# Patient Record
Sex: MALE | Race: WHITE | NOT HISPANIC OR LATINO | Employment: OTHER | URBAN - METROPOLITAN AREA
[De-identification: names, ages, dates, MRNs, and addresses within clinical notes are randomized per-mention and may not be internally consistent; named-entity substitution may affect disease eponyms.]

---

## 2023-11-21 ENCOUNTER — HOSPITAL ENCOUNTER (OUTPATIENT)
Dept: CARDIOLOGY | Facility: HOSPITAL | Age: 68
Discharge: HOME | End: 2023-11-21
Payer: OTHER GOVERNMENT

## 2023-11-21 ENCOUNTER — LAB (OUTPATIENT)
Dept: LAB | Facility: HOSPITAL | Age: 68
End: 2023-11-21
Payer: OTHER GOVERNMENT

## 2023-11-21 ENCOUNTER — TRANSCRIBE ORDERS (OUTPATIENT)
Dept: ADMINISTRATIVE | Facility: HOSPITAL | Age: 68
End: 2023-11-21
Payer: OTHER GOVERNMENT

## 2023-11-21 DIAGNOSIS — Z01.818 PREOP EXAMINATION: ICD-10-CM

## 2023-11-21 DIAGNOSIS — Z01.818 PREOP EXAMINATION: Primary | ICD-10-CM

## 2023-11-21 LAB
ANION GAP SERPL CALCULATED.3IONS-SCNC: 11 MMOL/L (ref 5–15)
BASOPHILS # BLD AUTO: 0.04 10*3/MM3 (ref 0–0.2)
BASOPHILS NFR BLD AUTO: 0.6 % (ref 0–1.5)
BUN SERPL-MCNC: 24 MG/DL (ref 8–23)
BUN/CREAT SERPL: 20.7 (ref 7–25)
CALCIUM SPEC-SCNC: 9.6 MG/DL (ref 8.6–10.5)
CHLORIDE SERPL-SCNC: 107 MMOL/L (ref 98–107)
CO2 SERPL-SCNC: 27 MMOL/L (ref 22–29)
CREAT SERPL-MCNC: 1.16 MG/DL (ref 0.76–1.27)
DEPRECATED RDW RBC AUTO: 37.3 FL (ref 37–54)
EGFRCR SERPLBLD CKD-EPI 2021: 67.8 ML/MIN/1.73
EOSINOPHIL # BLD AUTO: 0.15 10*3/MM3 (ref 0–0.4)
EOSINOPHIL NFR BLD AUTO: 2.1 % (ref 0.3–6.2)
ERYTHROCYTE [DISTWIDTH] IN BLOOD BY AUTOMATED COUNT: 12 % (ref 12.3–15.4)
GLUCOSE SERPL-MCNC: 96 MG/DL (ref 65–99)
HCT VFR BLD AUTO: 41.6 % (ref 37.5–51)
HGB BLD-MCNC: 14.1 G/DL (ref 13–17.7)
IMM GRANULOCYTES # BLD AUTO: 0.01 10*3/MM3 (ref 0–0.05)
IMM GRANULOCYTES NFR BLD AUTO: 0.1 % (ref 0–0.5)
LYMPHOCYTES # BLD AUTO: 1.56 10*3/MM3 (ref 0.7–3.1)
LYMPHOCYTES NFR BLD AUTO: 22.3 % (ref 19.6–45.3)
MCH RBC QN AUTO: 29.1 PG (ref 26.6–33)
MCHC RBC AUTO-ENTMCNC: 33.9 G/DL (ref 31.5–35.7)
MCV RBC AUTO: 85.8 FL (ref 79–97)
MONOCYTES # BLD AUTO: 0.8 10*3/MM3 (ref 0.1–0.9)
MONOCYTES NFR BLD AUTO: 11.4 % (ref 5–12)
NEUTROPHILS NFR BLD AUTO: 4.44 10*3/MM3 (ref 1.7–7)
NEUTROPHILS NFR BLD AUTO: 63.5 % (ref 42.7–76)
NRBC BLD AUTO-RTO: 0 /100 WBC (ref 0–0.2)
PLATELET # BLD AUTO: 246 10*3/MM3 (ref 140–450)
PMV BLD AUTO: 10.4 FL (ref 6–12)
POTASSIUM SERPL-SCNC: 4 MMOL/L (ref 3.5–5.2)
QT INTERVAL: 432 MS
QTC INTERVAL: 498 MS
RBC # BLD AUTO: 4.85 10*6/MM3 (ref 4.14–5.8)
SODIUM SERPL-SCNC: 145 MMOL/L (ref 136–145)
WBC NRBC COR # BLD AUTO: 7 10*3/MM3 (ref 3.4–10.8)

## 2023-11-21 PROCEDURE — 36415 COLL VENOUS BLD VENIPUNCTURE: CPT

## 2023-11-21 PROCEDURE — 80048 BASIC METABOLIC PNL TOTAL CA: CPT

## 2023-11-21 PROCEDURE — 93005 ELECTROCARDIOGRAM TRACING: CPT | Performed by: ORTHOPAEDIC SURGERY

## 2023-11-21 PROCEDURE — 85025 COMPLETE CBC W/AUTO DIFF WBC: CPT

## 2024-01-10 ENCOUNTER — TRANSCRIBE ORDERS (OUTPATIENT)
Dept: PHYSICAL THERAPY | Facility: CLINIC | Age: 69
End: 2024-01-10
Payer: OTHER GOVERNMENT

## 2024-01-10 DIAGNOSIS — S32.010A COMPRESSION FRACTURE OF L1 VERTEBRA, INITIAL ENCOUNTER: Primary | ICD-10-CM

## 2024-01-12 ENCOUNTER — TREATMENT (OUTPATIENT)
Dept: PHYSICAL THERAPY | Facility: CLINIC | Age: 69
End: 2024-01-12
Payer: OTHER GOVERNMENT

## 2024-01-12 DIAGNOSIS — M54.50 ACUTE BILATERAL LOW BACK PAIN WITHOUT SCIATICA: ICD-10-CM

## 2024-01-12 DIAGNOSIS — R29.898 WEAKNESS OF LEFT HIP: ICD-10-CM

## 2024-01-12 DIAGNOSIS — S32.010A COMPRESSION FRACTURE OF L1 VERTEBRA, INITIAL ENCOUNTER: Primary | ICD-10-CM

## 2024-01-12 NOTE — PROGRESS NOTES
Physical Therapy Initial Evaluation and Plan of Care  [facility] [place] [place]  [address], [address]  [place] [address]    Patient: Leonardo Monet   : 1955  Diagnosis/ICD-10 Code:  Compression fracture of L1 vertebra, initial encounter [S32.010A]  Referring practitioner: SHEN Alcazar  Date of Initial Visit: 2024  Today's Date: 2024  Patient seen for 1 sessions      ICD-10-CM ICD-9-CM   1. Compression fracture of L1 vertebra, initial encounter  S32.010A 805.4   2. Acute bilateral low back pain without sciatica  M54.50 724.2     338.19   3. Weakness of left hip  R29.898 729.89              Subjective Questionnaire: Oswestry: 24%      Subjective Evaluation    History of Present Illness  Date of onset: 2023  Date of surgery: 2023  Mechanism of injury: Leonardo Monet is a 68 year old male who reports to clinic today with current complaints of pain in his lower back that is exacerbated by lifting and prolonged walking.  He reports falling off a raised deck and onto the cab of his truck on 2023 and injuring his back.  He was found to have a compression fracture of L1 vertebra and had subsequent hypoplasty on 2023 and his pain decreased, but he has had lingering symptoms as noted above.      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      Static Posture     Thoracic Spine  Hypokyphosis.    Lumbar Spine   Decreased lordosis.     Postural Observations  Seated posture: fair  Standing posture: fair  Correction of posture: has no consistent effect      Palpation   Left   Hypertonic in the lumbar paraspinals.   Tenderness of the lumbar paraspinals.     Right   Hypertonic in the lumbar paraspinals. Tenderness of the lumbar paraspinals.     Tenderness     Additional Tenderness Details  Along transverse process B L1 region    Neurological Testing     Sensation     Lumbar   Left   Intact: light touch    Right   Intact: light touch    Comments   Left light touch: numbness and tingling in anterior thigh  Right light touch: numbness and tingling in anterior thigh    Active Range of Motion     Lumbar   Flexion: WFL  Extension: Active lumbar extension: moderately limited. with pain  Left lateral flexion: with pain  Right lateral flexion: WFL  Left rotation: with pain  Right rotation: WFL    Strength/Myotome Testing     Lumbar   Left   Heel walk: normal  Toe walk: normal    Right   Heel walk: normal  Toe walk: normal    Left Hip   Planes of Motion   Flexion: 4 (pain in back)  Extension: 5  Abduction: 5  Adduction: 5    Right Hip   Planes of Motion   Flexion: 5  Extension: 5  Abduction: 5  Adduction: 5    Left Knee   Flexion: 5  Extension: 5    Right Knee   Flexion: 5  Extension: 5    Left Ankle/Foot   Dorsiflexion: 5  Plantar flexion: 5  Great toe extension: 5    Right Ankle/Foot   Dorsiflexion: 5  Plantar flexion: 5  Great toe extension: 5    Tests       Thoracic   Negative slump.     Lumbar     Left   Negative passive SLR.     Right   Negative passive SLR.       See Exercise, Manual, and Modality Logs for complete treatment    Trial of interferential current electrical stimulation in attempt to reduce swelling, relieve pain, stop  muscle spasms, increase blood flow and support healing    Issued, instructed in & performed home exercise program below:   Access Code: [email or web address]  URL: [email or web address]  Date: 01/12/2024  Prepared by: Anmol Clemente    Exercises  - Supine Lower Trunk Rotation  - 1 x daily - 7 x weekly - 1 sets - 10 reps  - Supine Posterior Pelvic Tilt  - 1 x daily - 7 x weekly - 1 sets - 10 reps - 5 sec hold  - Supine Active Straight Leg Raise  - 1 x daily - 7 x weekly - 1 sets - 10 reps  - Standing Lumbar Extension at Wall - Forearms  - 1 x daily - 7 x weekly - 1 sets - 10 reps      Assessment & Plan       Assessment  Impairments: abnormal or restricted ROM, activity intolerance, impaired physical strength, lacks appropriate home exercise program and pain with function   Functional limitations: carrying objects, lifting, sleeping, walking, pulling, pushing, uncomfortable because of pain, standing and unable to perform repetitive tasks   Assessment details: 68 year old male who presents with the impairments noted above secondary to low back pain, hip weakness and decreased spinal range of motion and the impairments and functional limitations noted above.  These impairments decrease his ability and tolerance to perform his normal daily activities.  This patient presents with a level of complexity and his condition is such that the services required can be safely and effectively performed only by a qualified PT or supervised PTA.     Prognosis: good    Goals  Plan Goals: STG (6 weeks)  1) Independent in home program for lower extremity and core strengthening  2) Independent in home program for posture correction  3) Demonstrates basic understanding of condition and role in treatment progression  4) Tolerates initiation of resistive lower extremity and core strengthening   5) Demonstrates moderate improvement in tolerance to daily activity as evidenced by Oswestry disability score of 2% or less    LTG (12 Weeks)  1)  Independent in home program for self-management of  condition  2) Demonstrates substantial improvement in tolerance to daily activity as evidenced by Oswestry disability score of 10% or less  3) MMT of (B) lower extremities in major planes 5/5 to allow for improved tolerance to ambulation.  4) Demonstrates MMT for trunk flexion and extension of 5/5 to allow for improved spinal stability and improved tolerance to activities such as transfers and bending.  5) Demonstrates normal lumbar spinal mechanics to allow for pain-free motion of the lumbar spine.  6) Demonstrates good posture in standing and good posture in sitting to improve tolerance to those positions.  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 without increasing pain and without mobility issues      Plan  Therapy options: will be seen for skilled therapy services  Planned modality interventions: cryotherapy, high voltage pulsed current (pain management), high voltage pulsed current (spasm management), ultrasound, thermotherapy (hydrocollator packs), TENS and microcurrent electrical stimulation  Planned therapy interventions: abdominal trunk stabilization, balance/weight-bearing training, body mechanics training, flexibility, functional ROM exercises, home exercise program, IADL retraining, manual therapy, neuromuscular re-education, postural training, soft tissue mobilization, strengthening, stretching and therapeutic activities  Frequency: 2x week  Duration in weeks: 12  Treatment plan discussed with: patient        History # of Personal Factors and/or Comorbidities: LOW (0)  Examination of Body System(s): # of elements: LOW (1-2)  Clinical Presentation: STABLE   Clinical Decision Making: LOW     Timed:         Manual Therapy:         mins  99672;     Therapeutic Exercise:    20     mins  13504;     Neuromuscular Elsi:        mins  12337;    Therapeutic Activity:          mins  91207;     Gait Training:           mins  61146;      Ultrasound:          mins  99094;    Ionto                                   mins   45601  Self Care                            mins   77900      Un-Timed:  Electrical Stimulation:    15     mins  02223 ( );  Canalith Repos         mins 09064  Traction          mins 38357  Dry Needle                 ______ mins DRYNDL  Low Eval     30     Mins  81718  Mod Eval          Mins  21358  High Eval                            Mins  53360  Re-Eval                               mins  99774        Timed Treatment:   20   mins   Total Treatment:     65   mins    PT SIGNATURE: Sea Clemente, PT   DATE TREATMENT INITIATED: 1/12/2024    Initial Certification  Certification Period: 1/12/2024 through 4/11/2024  I certify that the therapy services are furnished while this patient is under my care.  The services outlined above are required by this patient, and will be reviewed every 90 days.     PHYSICIAN: Nir Piña PA      DATE:     Please sign and return via fax to [phone]. Thank you, [facility].

## 2024-01-16 ENCOUNTER — TREATMENT (OUTPATIENT)
Dept: PHYSICAL THERAPY | Facility: CLINIC | Age: 69
End: 2024-01-16
Payer: OTHER GOVERNMENT

## 2024-01-16 DIAGNOSIS — M54.50 ACUTE BILATERAL LOW BACK PAIN WITHOUT SCIATICA: ICD-10-CM

## 2024-01-16 DIAGNOSIS — S32.010A COMPRESSION FRACTURE OF L1 VERTEBRA, INITIAL ENCOUNTER: Primary | ICD-10-CM

## 2024-01-16 DIAGNOSIS — R29.898 WEAKNESS OF LEFT HIP: ICD-10-CM

## 2024-01-16 NOTE — PROGRESS NOTES
Physical Therapy Treatment Note  [facility] [place]  [address], [address]  [place] [address]    Patient: Leonardo Monet   : 1955  Diagnosis/ICD-10 Code:  Compression fracture of L1 vertebra, initial encounter [S32.010A]  Referring practitioner: SHEN Alcazar  Date of Initial Visit: No linked episodes  Today's Date: 2024  Patient seen for 2 sessions           Subjective     Leonardo Monet reports: Back pain levels still about the same.   Noticing that it is hurting more on his left side than his right.  No issues with home program.  Noes significant relief of symptoms with interferential current electrical stimulation last visit.    Objective   See Exercise, Manual, and Modality Logs for complete treatment.     Progressed / advanced exercises as noted in flow sheets with emphasis on core stabilization and strengthening.    Continued interferential current electrical stimulation to low back at end of treatment.      Assessment/Plan  Still sore in low back but is able to tolerate home program and interventions in clinic well.  Notes good response to interferential current electrical stimulation.    Progress per Plan of Care and Progress strengthening /stabilization /functional activity as tolerated.  Assess response to today's interventions.           Manual Therapy:         mins  22562;  Therapeutic Exercise:    18     mins  10097;     Neuromuscular Elsi:    10    mins  00564;    Therapeutic Activity:     10     mins  61256;     Gait Training:           mins  99689;     Ultrasound:          mins  23524;    Electrical Stimulation:    15     mins  33758 ( );  Dry Needling          mins self-pay    Timed Treatment:   38   mins   Total Treatment:     53   mins    Sea Clemente PT  Physical Therapist

## 2024-01-19 ENCOUNTER — TREATMENT (OUTPATIENT)
Dept: PHYSICAL THERAPY | Facility: CLINIC | Age: 69
End: 2024-01-19
Payer: OTHER GOVERNMENT

## 2024-01-19 DIAGNOSIS — R29.898 WEAKNESS OF LEFT HIP: ICD-10-CM

## 2024-01-19 DIAGNOSIS — M54.50 ACUTE BILATERAL LOW BACK PAIN WITHOUT SCIATICA: ICD-10-CM

## 2024-01-19 DIAGNOSIS — S32.010A COMPRESSION FRACTURE OF L1 VERTEBRA, INITIAL ENCOUNTER: Primary | ICD-10-CM

## 2024-01-21 NOTE — PROGRESS NOTES
Physical Therapy Treatment Note  [facility] [place] [facility] [place]  [address], [address]  [place] [address]    Patient: Leonardo Monet   : 1955  Diagnosis/ICD-10 Code:  Compression fracture of L1 vertebra, initial encounter [S32.010A]  Referring practitioner: SHEN Alcazar  Date of Initial Visit: Type: THERAPY  Noted: 2024  Today's Date: 2024    Patient seen for 3 sessions           Subjective     Leonardo Monet reports: continues to note significant relief of symptoms with interferential current electrical stimulation.  Overall some slight improvement.    Objective   See Exercise, Manual, and Modality Logs for complete treatment.     Progressed / advanced exercises as noted in flow sheets with emphasis on core stabilization and strengthening.    Continued interferential current electrical stimulation to low back at end of treatment.     Initiated hip strengthening and stabilization.    Assessment/Plan  Still sore in low back but is able to tolerate home program and interventions in clinic well.  Notes good response to interferential current electrical stimulation.    Progress per Plan of Care and Progress strengthening /stabilization /functional activity as tolerated.  Assess response to today's interventions.           Manual Therapy:         mins  01347;  Therapeutic Exercise:   20     mins  34270;     Neuromuscular Elsi:    10    mins  48475;    Therapeutic Activity:     10     mins  59232;     Gait Training:           mins  70051;     Ultrasound:          mins  47991;    Electrical Stimulation:    15     mins  57868 ( );  Dry Needling          mins self-pay    Timed Treatment:   40   mins   Total Treatment:     55   mins    Sea Clemente PT  Physical Therapist

## 2024-01-23 ENCOUNTER — TREATMENT (OUTPATIENT)
Dept: PHYSICAL THERAPY | Facility: CLINIC | Age: 69
End: 2024-01-23
Payer: OTHER GOVERNMENT

## 2024-01-23 DIAGNOSIS — M54.50 ACUTE BILATERAL LOW BACK PAIN WITHOUT SCIATICA: ICD-10-CM

## 2024-01-23 DIAGNOSIS — R29.898 WEAKNESS OF LEFT HIP: ICD-10-CM

## 2024-01-23 DIAGNOSIS — S32.010A COMPRESSION FRACTURE OF L1 VERTEBRA, INITIAL ENCOUNTER: Primary | ICD-10-CM

## 2024-01-23 NOTE — PROGRESS NOTES
Physical Therapy Treatment Note  [facility] [place]  [address], [address]  [place] [address]    Patient: Leonardo Monet   : 1955  Diagnosis/ICD-10 Code:  Compression fracture of L1 vertebra, initial encounter [S32.010A]  Referring practitioner: SHEN Alcazar  Date of Initial Visit: Type: THERAPY  Noted: 2024  Today's Date: 2024    Patient seen for 4 sessions           Subjective     Leonardo Monet reports: Back pain still about the same,  He notes some right sided pain in his mid-back when walking and continues to have pain on left side at level of surgery site.  He has ordered home TENS/muscle stimulation unit for home.      Objective   See Exercise, Manual, and Modality Logs for complete treatment.     Progressed / advanced exercises as noted in flow sheets with emphasis on core stabilization and strengthening.    Continued interferential current electrical stimulation to low back at end of treatment.     Added lumbar stretch in sitting.  Hip add and abd changed to sitting.    Interferential current electrical stimulation today in supine vs prone.    Assessment/Plan  Continues to have soreness in low back and now notes some pain with walking in mid-back.  He is improving with his performance of exercises and had good response today with seated lumbar stretch and changing position for interferential current electrical stimulation from prone to supine.    Progress per Plan of Care and Progress strengthening /stabilization /functional activity as tolerated.  Assess response to today's interventions.           Manual Therapy:         mins  24981;  Therapeutic Exercise:   20     mins  98964;     Neuromuscular Elsi:    10    mins  60772;    Therapeutic Activity:     10     mins  91201;     Gait Training:           mins  07954;     Ultrasound:          mins  40728;    Electrical Stimulation:    15     mins  89849 ( );  Dry Needling          mins self-pay    Timed Treatment:   40   mins    Total Treatment:     55   mins    Sea Clemente, PT  Physical Therapist

## 2024-01-25 ENCOUNTER — TREATMENT (OUTPATIENT)
Dept: PHYSICAL THERAPY | Facility: CLINIC | Age: 69
End: 2024-01-25
Payer: OTHER GOVERNMENT

## 2024-01-25 DIAGNOSIS — M54.50 ACUTE BILATERAL LOW BACK PAIN WITHOUT SCIATICA: ICD-10-CM

## 2024-01-25 DIAGNOSIS — R29.898 WEAKNESS OF LEFT HIP: ICD-10-CM

## 2024-01-25 DIAGNOSIS — S32.010A COMPRESSION FRACTURE OF L1 VERTEBRA, INITIAL ENCOUNTER: Primary | ICD-10-CM

## 2024-01-25 NOTE — PROGRESS NOTES
Physical Therapy Treatment Note  [facility] [place]  [address], [address]  [place] [address]    Patient: Leonardo Monet   : 1955  Diagnosis/ICD-10 Code:  Compression fracture of L1 vertebra, initial encounter [S32.010A]  Referring practitioner: SHEN Alcazar  Date of Initial Visit: Type: THERAPY  Noted: 2024  Today's Date: 2024    Patient seen for 5 sessions           Subjective     Leonardo Monet reports: He had some increased soreness after last visit.  He is not sure if the may have increased the intensity of the interferential current electrical stimulation too much.  His home TENS unit has not arrived yet and is due to arrive on Monday.  He does note feeling a little better this morning.    Objective   See Exercise, Manual, and Modality Logs for complete treatment.     Progressed / advanced exercises as noted in flow sheets with emphasis on core stabilization and strengthening.    Continued interferential current electrical stimulation to low back at end of treatment.     Continued lumbar stretch in sitting.  Hip add and abd changed to sitting.     Interferential current electrical stimulation today in supine vs prone.    Assessment/Plan  Some subjective improvement noted by patient.  Doing well with performance of exercises in clinic.  Tolerated progression of exercises well today.    Progress per Plan of Care and Progress strengthening /stabilization /functional activity as tolerated.  Assess response to today's interventions.           Manual Therapy:         mins  82876;  Therapeutic Exercise:   20     mins  92886;     Neuromuscular Elsi:    10    mins  71846;    Therapeutic Activity:     10     mins  65670;     Gait Training:           mins  25608;     Ultrasound:          mins  32079;    Electrical Stimulation:    15     mins  52416 ( );  Dry Needling          mins self-pay    Timed Treatment:   40   mins   Total Treatment:     55   mins    Sea Clemente, PT  Physical  Therapist

## 2024-01-30 ENCOUNTER — TREATMENT (OUTPATIENT)
Dept: PHYSICAL THERAPY | Facility: CLINIC | Age: 69
End: 2024-01-30
Payer: OTHER GOVERNMENT

## 2024-01-30 DIAGNOSIS — R29.898 WEAKNESS OF LEFT HIP: ICD-10-CM

## 2024-01-30 DIAGNOSIS — M54.50 ACUTE BILATERAL LOW BACK PAIN WITHOUT SCIATICA: ICD-10-CM

## 2024-01-30 DIAGNOSIS — S32.010A COMPRESSION FRACTURE OF L1 VERTEBRA, INITIAL ENCOUNTER: Primary | ICD-10-CM

## 2024-01-30 NOTE — PROGRESS NOTES
Physical Therapy Treatment Note  [facility] [place]  [address], [address]  [place] [address]    Patient: Leonardo Monet   : 1955  Diagnosis/ICD-10 Code:  Compression fracture of L1 vertebra, initial encounter [S32.010A]  Referring practitioner: SHEN Alcazar  Date of Initial Visit: Type: THERAPY  Noted: 2024  Today's Date: 2024    Patient seen for 6 sessions           Subjective     Leonardo Monet reports: He continues to notes intermittent pain, but notable relief with interferential current electrical stimulation and MNES today. Denies any radiating leg pain.  He brought in home unit and would like to go over use of unit.    Objective   See Exercise, Manual, and Modality Logs for complete treatment.     Progressed / advanced exercises as noted in flow sheets with emphasis on core stabilization and strengthening.    Patient education:  Went over use of unit in TENS mode and in NEMS.   Instructions on use, care and precautions.  Trial of MNES to low back today.      Continued lumbar stretch in sitting.  Hip add and abd changed to sitting.     Rigid with extension and with left rotation atL5/S1    Assessment/Plan  Some subjective improvement noted by patient.  Good response to use of NMES.  Doing well with performance of exercises in clinic.  Tolerated progression of exercises well today.    Progress per Plan of Care and Progress strengthening /stabilization /functional activity as tolerated.  Assess response to NMES intervention, go over home unit if he brings it in today.           Manual Therapy:         mins  45610;  Therapeutic Exercise:   20     mins  36196;     Neuromuscular Elsi:    10    mins  50593;    Therapeutic Activity:     10     mins  35601;     Gait Training:           mins  62429;     Ultrasound:          mins  77772;    Electrical Stimulation:    15     mins  45391 ( );  Dry Needling          mins self-pay    Timed Treatment:   40   mins   Total Treatment:     55    mins    Sea Clemente, PT  Physical Therapist

## 2024-02-02 ENCOUNTER — TREATMENT (OUTPATIENT)
Dept: PHYSICAL THERAPY | Facility: CLINIC | Age: 69
End: 2024-02-02
Payer: OTHER GOVERNMENT

## 2024-02-02 DIAGNOSIS — M54.50 ACUTE BILATERAL LOW BACK PAIN WITHOUT SCIATICA: ICD-10-CM

## 2024-02-02 DIAGNOSIS — R29.898 WEAKNESS OF LEFT HIP: ICD-10-CM

## 2024-02-02 DIAGNOSIS — S32.010A COMPRESSION FRACTURE OF L1 VERTEBRA, INITIAL ENCOUNTER: Primary | ICD-10-CM

## 2024-02-02 NOTE — PROGRESS NOTES
Physical Therapy Treatment Note  [facility] [place]  [address], [address]  [place] [address]    Patient: Leonardo Monet   : 1955  Diagnosis/ICD-10 Code:  Compression fracture of L1 vertebra, initial encounter [S32.010A]  Referring practitioner: SHEN Alcazar  Date of Initial Visit: Type: THERAPY  Noted: 2024  Today's Date: 2024    Patient seen for 7 sessions           Subjective     Leonardo Monet reports: Doing very well with use of home NEMS unit.  Was pain free for an extended period of time.  Does note some skin irritation on back.  He is not sure if it is reaction to hot tub use or from electrodes.    Objective    See Exercise, Manual, and Modality Logs for complete treatment.     Progressed / advanced exercises as noted in flow sheets with emphasis on core stabilization and strengthening    Resumed interferential current electrical stimulation in clinic.to low back at end of treatment.    Patient education:  make sure to alternate electrode placement.  Have spouse check skin daily.       Continued lumbar stretch in sitting.  Hip add and abd changed to sitting.     Interferential current electrical stimulation today in supine vs prone.    Rigid with extension and with left rotation atL5/S1    Assessment/Plan  Doing well wit pain control with home NEMS/TENS.   Tolerated progression of exercises well today.  Need to work on restoring spinal mobility, especially lumbar extension.    Progress per Plan of Care and Progress strengthening /stabilization /functional activity as tolerated.  Assess response to today's interventions.           Manual Therapy:         mins  34182;  Therapeutic Exercise:   20     mins  16609;     Neuromuscular Elsi:    10    mins  00849;    Therapeutic Activity:     10     mins  36698;     Gait Training:           mins  79542;     Ultrasound:          mins  30313;    Electrical Stimulation:    15     mins  19101 ( );  Dry Needling          mins  self-pay    Timed Treatment:   40   mins   Total Treatment:     55   mins    Sea Clemente, PT  Physical Therapist

## 2024-02-06 ENCOUNTER — TREATMENT (OUTPATIENT)
Dept: PHYSICAL THERAPY | Facility: CLINIC | Age: 69
End: 2024-02-06
Payer: OTHER GOVERNMENT

## 2024-02-06 DIAGNOSIS — R29.898 WEAKNESS OF LEFT HIP: ICD-10-CM

## 2024-02-06 DIAGNOSIS — M54.50 ACUTE BILATERAL LOW BACK PAIN WITHOUT SCIATICA: ICD-10-CM

## 2024-02-06 DIAGNOSIS — S32.010A COMPRESSION FRACTURE OF L1 VERTEBRA, INITIAL ENCOUNTER: Primary | ICD-10-CM

## 2024-02-06 PROCEDURE — 97110 THERAPEUTIC EXERCISES: CPT | Performed by: PHYSICAL THERAPIST

## 2024-02-06 PROCEDURE — G0283 ELEC STIM OTHER THAN WOUND: HCPCS | Performed by: PHYSICAL THERAPIST

## 2024-02-06 PROCEDURE — 97530 THERAPEUTIC ACTIVITIES: CPT | Performed by: PHYSICAL THERAPIST

## 2024-02-06 PROCEDURE — 97112 NEUROMUSCULAR REEDUCATION: CPT | Performed by: PHYSICAL THERAPIST

## 2024-02-09 ENCOUNTER — TREATMENT (OUTPATIENT)
Dept: PHYSICAL THERAPY | Facility: CLINIC | Age: 69
End: 2024-02-09
Payer: OTHER GOVERNMENT

## 2024-02-09 DIAGNOSIS — R29.898 WEAKNESS OF LEFT HIP: ICD-10-CM

## 2024-02-09 DIAGNOSIS — S32.010A COMPRESSION FRACTURE OF L1 VERTEBRA, INITIAL ENCOUNTER: Primary | ICD-10-CM

## 2024-02-09 DIAGNOSIS — M54.50 ACUTE BILATERAL LOW BACK PAIN WITHOUT SCIATICA: ICD-10-CM

## 2024-02-09 PROCEDURE — 97530 THERAPEUTIC ACTIVITIES: CPT | Performed by: PHYSICAL THERAPIST

## 2024-02-09 PROCEDURE — 97112 NEUROMUSCULAR REEDUCATION: CPT | Performed by: PHYSICAL THERAPIST

## 2024-02-09 PROCEDURE — G0283 ELEC STIM OTHER THAN WOUND: HCPCS | Performed by: PHYSICAL THERAPIST

## 2024-02-09 PROCEDURE — 97110 THERAPEUTIC EXERCISES: CPT | Performed by: PHYSICAL THERAPIST

## 2024-02-13 ENCOUNTER — TREATMENT (OUTPATIENT)
Dept: PHYSICAL THERAPY | Facility: CLINIC | Age: 69
End: 2024-02-13
Payer: OTHER GOVERNMENT

## 2024-02-13 DIAGNOSIS — R29.898 WEAKNESS OF LEFT HIP: ICD-10-CM

## 2024-02-13 DIAGNOSIS — M54.50 ACUTE BILATERAL LOW BACK PAIN WITHOUT SCIATICA: ICD-10-CM

## 2024-02-13 DIAGNOSIS — S32.010A COMPRESSION FRACTURE OF L1 VERTEBRA, INITIAL ENCOUNTER: Primary | ICD-10-CM

## 2024-02-20 ENCOUNTER — TREATMENT (OUTPATIENT)
Dept: PHYSICAL THERAPY | Facility: CLINIC | Age: 69
End: 2024-02-20
Payer: OTHER GOVERNMENT

## 2024-02-20 DIAGNOSIS — S32.010A COMPRESSION FRACTURE OF L1 VERTEBRA, INITIAL ENCOUNTER: Primary | ICD-10-CM

## 2024-02-20 DIAGNOSIS — M54.50 ACUTE BILATERAL LOW BACK PAIN WITHOUT SCIATICA: ICD-10-CM

## 2024-02-20 DIAGNOSIS — R29.898 WEAKNESS OF LEFT HIP: ICD-10-CM

## 2024-02-20 NOTE — PROGRESS NOTES
Physical Therapy Treatment Note  [facility] [place]  [address], [address]  [place] [address]    Patient: Leonardo Monet   : 1955  Diagnosis/ICD-10 Code:  Compression fracture of L1 vertebra, initial encounter [S32.010A]  Referring practitioner: SHEN Alcazar  Date of Initial Visit: Type: THERAPY  Noted: 2024  Today's Date: 2024    Patient seen for 11 sessions           Subjective     Leonardo Monet reports: He had some increased soreness after using home muscle stim unit for long time at home.  His pain went away after lying down and resting.  Asked if it was possible to use muscle stim unit too much.    Objective    See Exercise, Manual, and Modality Logs for complete treatment.     Progressed / advanced exercises as noted in flow sheets with emphasis on core stabilization and strengthening    Continued  interferential current electrical stimulation in clinic.to low back at end of treatment.    Patient education:  Have spouse check skin daily due to spots on back (above e-stim pad placement) that look slightly reddened and scaly.    Interferential current electrical stimulation today in supine vs prone.    Rigid with extension and with left rotation atL5/S1    Added mid and upper back exercises today.    Assessment/Plan  Doing well wit pain control with home NEMS/TENS.   Tolerated progression of exercises well today.  Need to work on restoring spinal mobility, especially lumbar extension.    Progress per Plan of Care and Progress strengthening /stabilization /functional activity as tolerated.  Assess response to today's interventions.           Manual Therapy:         mins  42401;  Therapeutic Exercise:   20     mins  90790;     Neuromuscular Elsi:    10    mins  41848;    Therapeutic Activity:     10     mins  83840;     Gait Training:           mins  16740;     Ultrasound:          mins  57231;    Electrical Stimulation:    15     mins  31600 ( );  Dry Needling          mins  self-pay    Timed Treatment:   40   mins   Total Treatment:     55   mins    Sea Clemente, PT  Physical Therapist

## 2024-02-23 ENCOUNTER — TREATMENT (OUTPATIENT)
Dept: PHYSICAL THERAPY | Facility: CLINIC | Age: 69
End: 2024-02-23
Payer: OTHER GOVERNMENT

## 2024-02-23 DIAGNOSIS — M54.50 ACUTE BILATERAL LOW BACK PAIN WITHOUT SCIATICA: ICD-10-CM

## 2024-02-23 DIAGNOSIS — S32.010A COMPRESSION FRACTURE OF L1 VERTEBRA, INITIAL ENCOUNTER: Primary | ICD-10-CM

## 2024-02-23 DIAGNOSIS — R29.898 WEAKNESS OF LEFT HIP: ICD-10-CM

## 2024-02-23 NOTE — PROGRESS NOTES
Physical Therapy Daily Treatment Note    Patient: Leonardo Monet  : 1955  Referring practitioner: SHEN Alcazar  Today's Date: 2024    VISIT#: 12      Subjective   Leonardo Monet reports: Says he is doing ok, is actually a little sore today, thinks he over did it yesterday, was on the floor fixing a fan and had a lot of pain when he went to get up.       Objective     See Exercise, Manual, and Modality Logs for complete treatment.     Patient Education: continue HEP.     Assessment/Plan  Good response to session, only reported some mild pain with pallof press when facing to the right, otherwise tolerated all exercises well and is demonstrating improved functional mobility.    Progress per Plan of Care            Timed:         Manual Therapy:         mins  90447;     Therapeutic Exercise:    20     mins  26957;     Neuromuscular Elsi:        mins  94696;    Therapeutic Activity:     10     mins  07791;     Gait Training:           mins  72006;     Ultrasound:          mins  11519;    Ionto:                                   mins   08096  Self Care:                            mins   76964    Un-Timed:  Electrical Stimulation:    15     mins  83747 ( );  Dry Needling          mins self-pay  Traction          mins 37077  Re-Eval                               mins  38163    Timed Treatment:   30   mins   Total Treatment:     45   mins    Lisa Conte, PT  Physical Therapist  [place] PT license #: [id number]  [place] PT license #: [id number]

## 2024-02-27 ENCOUNTER — TREATMENT (OUTPATIENT)
Dept: PHYSICAL THERAPY | Facility: CLINIC | Age: 69
End: 2024-02-27
Payer: OTHER GOVERNMENT

## 2024-02-27 DIAGNOSIS — R29.898 WEAKNESS OF LEFT HIP: ICD-10-CM

## 2024-02-27 DIAGNOSIS — M54.50 ACUTE BILATERAL LOW BACK PAIN WITHOUT SCIATICA: ICD-10-CM

## 2024-02-27 DIAGNOSIS — S32.010A COMPRESSION FRACTURE OF L1 VERTEBRA, INITIAL ENCOUNTER: Primary | ICD-10-CM

## 2024-02-27 NOTE — PROGRESS NOTES
Physical Therapy Treatment Note  [facility] [place]  [address], [address]  [place] [address]    Patient: Leonardo Monte   : 1955  Diagnosis/ICD-10 Code:  Compression fracture of L1 vertebra, initial encounter [S32.010A]  Referring practitioner: SHEN Alcazar  Date of Initial Visit: Type: THERAPY  Noted: 2024  Today's Date: 2024    Patient seen for 13 sessions           Subjective     Leonardo Monet reports: His back is still hurting, some days has increased pain, other days not as much.   Gets relief with heating pain and stimulation. He does note some intermittent pain in his left thigh at times.  He reports old MRI back in  showed bulging discs in lumbar spine.    Objective    See Exercise, Manual, and Modality Logs for complete treatment.     Progressed / advanced exercises as noted in flow sheets with emphasis on core stabilization and strengthening    Continued  interferential current electrical stimulation in clinic.to low back at end of treatment.    Patient education:  Have spouse check skin daily due to spots on back (above e-stim pad placement) that look slightly reddened and scaly.    Interferential current electrical stimulation today in supine vs prone.        Assessment/Plan  Doing well wit pain control with home NEMS/TENS.   Tolerated progression of exercises well today.  Need to work on restoring spinal mobility, especially lumbar extension.    Progress per Plan of Care and Progress strengthening /stabilization /functional activity as tolerated.  Assess response to today's interventions.  Work towards independent home program for self-management of his condition.           Manual Therapy:         mins  57534;  Therapeutic Exercise:   20     mins  25418;     Neuromuscular Elsi:    10    mins  59672;    Therapeutic Activity:     10     mins  87584;     Gait Training:           mins  14107;     Ultrasound:          mins  73480;    Electrical Stimulation:    15     mins   36185 ( );  Dry Needling          mins self-pay    Timed Treatment:   40   mins   Total Treatment:     55   mins    [place] PT  Physical Therapist

## 2024-03-01 ENCOUNTER — TREATMENT (OUTPATIENT)
Dept: PHYSICAL THERAPY | Facility: CLINIC | Age: 69
End: 2024-03-01
Payer: OTHER GOVERNMENT

## 2024-03-01 DIAGNOSIS — R29.898 WEAKNESS OF LEFT HIP: ICD-10-CM

## 2024-03-01 DIAGNOSIS — S32.010A COMPRESSION FRACTURE OF L1 VERTEBRA, INITIAL ENCOUNTER: Primary | ICD-10-CM

## 2024-03-01 DIAGNOSIS — M54.50 ACUTE BILATERAL LOW BACK PAIN WITHOUT SCIATICA: ICD-10-CM

## 2024-03-01 NOTE — PROGRESS NOTES
Physical Therapy Treatment Note  [facility] [place]  [address], [address]  [place] [address]    Patient: Leonardo Monet   : 1955  Diagnosis/ICD-10 Code:  Compression fracture of L1 vertebra, initial encounter [S32.010A]  Referring practitioner: SHEN Alcazar  Date of Initial Visit: Type: THERAPY  Noted: 2024  Today's Date: 3/1/2024    Patient seen for 14 sessions           Subjective     Leonardo Monet reports: He still has some pain in his back, about the same as last visit.    Objective    See Exercise, Manual, and Modality Logs for complete treatment.     Progressed / advanced exercises as noted in flow sheets with emphasis on core stabilization and strengthening    Continued  interferential current electrical stimulation in clinic.to low back at end of treatment.    Interferential current electrical stimulation today in supine vs prone.    Did note a left lateral shift in spine today, so added shift correction.    Assessment/Plan  Doing well wit pain control with home NEMS/TENS.   Tolerated progression of exercises well today.  Seems to have plateaued with response to therapy.    Work towards independent home program for self-management of his condition.  Re-assess for possible d/c next visit.           Manual Therapy:         mins  64417;  Therapeutic Exercise:   20     mins  90966;     Neuromuscular Elsi:    10    mins  66168;    Therapeutic Activity:     10     mins  39985;     Gait Training:           mins  98056;     Ultrasound:          mins  71819;    Electrical Stimulation:    15     mins  67705 ( );  Dry Needling          mins self-pay    Timed Treatment:   40   mins   Total Treatment:     55   mins    [facility] PT  Physical Therapist

## 2024-03-05 ENCOUNTER — TREATMENT (OUTPATIENT)
Dept: PHYSICAL THERAPY | Facility: CLINIC | Age: 69
End: 2024-03-05
Payer: OTHER GOVERNMENT

## 2024-03-05 DIAGNOSIS — M54.50 ACUTE BILATERAL LOW BACK PAIN WITHOUT SCIATICA: ICD-10-CM

## 2024-03-05 DIAGNOSIS — S32.010A COMPRESSION FRACTURE OF L1 VERTEBRA, INITIAL ENCOUNTER: Primary | ICD-10-CM

## 2024-03-05 DIAGNOSIS — R29.898 WEAKNESS OF LEFT HIP: ICD-10-CM

## 2024-03-05 NOTE — LETTER
[facility] [place]  [address], [address]  [place] [address]    Patient: Leonardo Monet   : 1955  Referring practitioner: SHEN Alcazar  Date of Initial Visit: Episode Type: THERAPY  Noted: 2024  Today's Date: 3/5/2024  Patient seen for 15 sessions.    Diagnosis/ICD-10 Codes:    ICD-10-CM ICD-9-CM   1. Compression fracture of L1 vertebra, initial encounter  S32.010A 805.4   2. Acute bilateral low back pain without sciatica  M54.50 724.2     338.19   3. Weakness of left hip  R29.898 729.89       Subjective:     Subjective Questionnaire: Oswestry: 24%  Clinical Progress: improved  Home Program Compliance: Yes  Treatment has included: therapeutic exercise, neuromuscular re-education, therapeutic activity, and electrical stimulation    Subjective     Leonardo Monet reports: He feels like he has gotten considerably better in the area of his back where the surgery was  (L1) but now has pain at his belt line and decreased ability to stand fully erect and extend his back much    Pain  Current pain ratin  At best pain ratin  At worst pain ratin  Location: across low back  Quality: burning  Relieving factors: heat (sitting), electrical stimulation, TENS  Aggravating factors: ambulation and lifting    Objective     Special Questions        Additional Special Questions  Denies bowel dysfunction, bladder dysfunction and saddle (s4) numbness      Notes a several year history of intermittent numbness and tingling in anterior thighs B which was attributed to disc issues.     Static Posture      Thoracic Spine  Hypokyphosis.     Lumbar Spine   Decreased lordosis.      Postural Observations  Seated posture: fair  Standing posture: fair  Correction of posture: has no consistent effect       Tenderness      Additional Tenderness Details  Along transverse process B L1 region     Neurological Testing      Sensation      Lumbar   Left   Intact: light touch     Right   Intact: light touch     Comments    Left light touch: numbness and tingling in anterior thigh  Right light touch: numbness and tingling in anterior thigh     Active Range of Motion      Lumbar   Flexion: WFL  Extension: Active lumbar extension: slightly limited  Left lateral flexion: WFL  Right lateral flexion: WFL  Left rotation: WFL  Right rotation: WFL     Strength/Myotome Testing      Lumbar   Left   Heel walk: normal  Toe walk: normal     Right   Heel walk: normal  Toe walk: normal     Left Hip   Planes of Motion   Flexion: 5 (mild pain in back)  Extension: 5  Abduction: 5  Adduction: 5     Right Hip   Planes of Motion   Flexion: 5  Extension: 5  Abduction: 5  Adduction: 5     Left Knee   Flexion: 5  Extension: 5     Right Knee   Flexion: 5  Extension: 5     Left Ankle/Foot   Dorsiflexion: 5  Plantar flexion: 5  Great toe extension: 5     Right Ankle/Foot   Dorsiflexion: 5  Plantar flexion: 5  Great toe extension: 5     Tests         Thoracic   Negative slump.      Lumbar      Left   Negative passive SLR.   (+) sacral base sping test     Right   Negative passive SLR.    (+) sacral base sping test    Decreased flexibility of B piriformis muscles.     See Exercise, Manual, and Modality Logs for complete treatment    Issued, instructed in & performed home exercise program below:   Access Code: [email or web address]  URL: [email or web address]  Date: 03/05/2024  Prepared by: Anmol Clemente    Exercises  - Supine Lower Trunk Rotation  - 3 x daily - 7 x weekly - 1 sets - 10 reps  - Standing Lumbar Extension at Wall - Forearms  - 5 x daily - 7 x weekly - 1 sets - 10 reps  - Seated Piriformis Stretch  - 1 x daily - 7 x weekly - 1 sets - 3 reps - 20 sec hold    Assessment/Plan  Leonardo Monet has demonstrated improvement as noted by subjective reports.  The pain from the area around the area of his fracture is essentially resolved, but continues to have pain in B SI region a as well as decreased mobility there.     He still has intermittent pain, low back  stiffness and decreased tolerance to activity.  Would recommend continuing PT to address these issues.    Progress toward previous goals: Partially Met    Goals    Short-term goals (STG): 5/5  Long-term goals (LTG): 1/7    STG (6 weeks)  1) Independent in home program for lower extremity and core strengthening (MET)   2) Independent in home program for posture correction (MET)   3) Demonstrates basic understanding of condition and role in treatment progression (MET)   4) Tolerates initiation of resistive lower extremity and core strengthening  (MET)   5) Demonstrates moderate improvement in tolerance to daily activity as evidenced by Oswestry disability score of 20% or less (MET)      LTG (12 Weeks)  1) Independent in home program for self-management of  condition (MET)   2) Demonstrates substantial improvement in tolerance to daily activity as evidenced by Oswestry disability score of 10% or less (NOT MET)   3) MMT of (B) lower extremities in major planes 5/5 to allow for improved tolerance to ambulation.   (MET)   4) Demonstrates MMT for trunk flexion and extension of 5/5 to allow for improved spinal stability and improved tolerance to activities such as transfers and bending. (NOT MET)   5) Demonstrates normal lumbar spinal mechanics to allow for pain-free motion of the lumbar spine. (NOT MET)   6) Demonstrates good posture in standing and good posture in sitting to improve tolerance to those positions.  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

## 2024-03-05 NOTE — PROGRESS NOTES
Re-Assessment / Progress Note   [facility] [place]  [address], [address]  [place] [address]    Patient: Leonardo Monet   : 1955  Referring practitioner: SHEN Alcazar  Date of Initial Visit: Episode Type: THERAPY  Noted: 2024  Today's Date: 3/5/2024  Patient seen for 15 sessions.    Diagnosis/ICD-10 Codes:    ICD-10-CM ICD-9-CM   1. Compression fracture of L1 vertebra, initial encounter  S32.010A 805.4   2. Acute bilateral low back pain without sciatica  M54.50 724.2     338.19   3. Weakness of left hip  R29.898 729.89       Subjective:     Subjective Questionnaire: Oswestry: 24%  Clinical Progress: improved  Home Program Compliance: Yes  Treatment has included: therapeutic exercise, neuromuscular re-education, therapeutic activity, and electrical stimulation    Subjective     Leonardo Monet reports: He feels like he has gotten considerably better in the area of his back where the surgery was  (L1) but now has pain at his belt line and decreased ability to stand fully erect and extend his back much    Pain  Current pain ratin  At best pain ratin  At worst pain ratin  Location: across low back  Quality: burning  Relieving factors: heat (sitting), electrical stimulation, TENS  Aggravating factors: ambulation and lifting    Objective     Special Questions        Additional Special Questions  Denies bowel dysfunction, bladder dysfunction and saddle (s4) numbness      Notes a several year history of intermittent numbness and tingling in anterior thighs B which was attributed to disc issues.     Static Posture      Thoracic Spine  Hypokyphosis.     Lumbar Spine   Decreased lordosis.      Postural Observations  Seated posture: fair  Standing posture: fair  Correction of posture: has no consistent effect       Tenderness      Additional Tenderness Details  Along transverse process B L1 region     Neurological Testing      Sensation      Lumbar   Left   Intact: light touch     Right    Intact: light touch     Comments   Left light touch: numbness and tingling in anterior thigh  Right light touch: numbness and tingling in anterior thigh     Active Range of Motion      Lumbar   Flexion: WFL  Extension: Active lumbar extension: slightly limited  Left lateral flexion: WFL  Right lateral flexion: WFL  Left rotation: WFL  Right rotation: WFL     Strength/Myotome Testing      Lumbar   Left   Heel walk: normal  Toe walk: normal     Right   Heel walk: normal  Toe walk: normal     Left Hip   Planes of Motion   Flexion: 5 (mild pain in back)  Extension: 5  Abduction: 5  Adduction: 5     Right Hip   Planes of Motion   Flexion: 5  Extension: 5  Abduction: 5  Adduction: 5     Left Knee   Flexion: 5  Extension: 5     Right Knee   Flexion: 5  Extension: 5     Left Ankle/Foot   Dorsiflexion: 5  Plantar flexion: 5  Great toe extension: 5     Right Ankle/Foot   Dorsiflexion: 5  Plantar flexion: 5  Great toe extension: 5     Tests         Thoracic   Negative slump.      Lumbar      Left   Negative passive SLR.   (+) sacral base sping test     Right   Negative passive SLR.    (+) sacral base sping test    Decreased flexibility of B piriformis muscles.     See Exercise, Manual, and Modality Logs for complete treatment    Issued, instructed in & performed home exercise program below:   Access Code: [email or web address]  URL: [email or web address]  Date: 03/05/2024  Prepared by: Anmol Clemente    Exercises  - Supine Lower Trunk Rotation  - 3 x daily - 7 x weekly - 1 sets - 10 reps  - Standing Lumbar Extension at Wall - Forearms  - 5 x daily - 7 x weekly - 1 sets - 10 reps  - Seated Piriformis Stretch  - 1 x daily - 7 x weekly - 1 sets - 3 reps - 20 sec hold    Assessment/Plan  Leonardo Monet has demonstrated improvement as noted by subjective reports.  The pain from the area around the area of his fracture is essentially resolved, but continues to have pain in B SI region a as well as decreased mobility there.     He still  has intermittent pain, low back stiffness and decreased tolerance to activity.  Would recommend continuing PT to address these issues.    Progress toward previous goals: Partially Met    Goals    Short-term goals (STG): 5/5  Long-term goals (LTG): 1/7    STG (6 weeks)  1) Independent in home program for lower extremity and core strengthening (MET)   2) Independent in home program for posture correction (MET)   3) Demonstrates basic understanding of condition and role in treatment progression (MET)   4) Tolerates initiation of resistive lower extremity and core strengthening  (MET)   5) Demonstrates moderate improvement in tolerance to daily activity as evidenced by Oswestry disability score of 20% or less (MET)      LTG (12 Weeks)  1) Independent in home program for self-management of  condition (MET)   2) Demonstrates substantial improvement in tolerance to daily activity as evidenced by Oswestry disability score of 10% or less (NOT MET)   3) MMT of (B) lower extremities in major planes 5/5 to allow for improved tolerance to ambulation.   (MET)   4) Demonstrates MMT for trunk flexion and extension of 5/5 to allow for improved spinal stability and improved tolerance to activities such as transfers and bending. (NOT MET)   5) Demonstrates normal lumbar spinal mechanics to allow for pain-free motion of the lumbar spine. (NOT MET)   6) Demonstrates good posture in standing and good posture in sitting to improve tolerance to those positions.  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